# Patient Record
Sex: MALE | Race: WHITE | NOT HISPANIC OR LATINO | URBAN - METROPOLITAN AREA
[De-identification: names, ages, dates, MRNs, and addresses within clinical notes are randomized per-mention and may not be internally consistent; named-entity substitution may affect disease eponyms.]

---

## 2017-11-08 PROBLEM — Z00.00 ENCOUNTER FOR PREVENTIVE HEALTH EXAMINATION: Status: ACTIVE | Noted: 2017-11-08

## 2017-11-14 ENCOUNTER — OUTPATIENT (OUTPATIENT)
Dept: OUTPATIENT SERVICES | Facility: HOSPITAL | Age: 62
LOS: 1 days | End: 2017-11-14
Payer: COMMERCIAL

## 2017-11-14 ENCOUNTER — TRANSCRIPTION ENCOUNTER (OUTPATIENT)
Age: 62
End: 2017-11-14

## 2017-11-14 DIAGNOSIS — R19.4 CHANGE IN BOWEL HABIT: ICD-10-CM

## 2017-11-14 PROCEDURE — 45378 DIAGNOSTIC COLONOSCOPY: CPT

## 2017-12-17 ENCOUNTER — EMERGENCY (EMERGENCY)
Facility: HOSPITAL | Age: 62
LOS: 1 days | Discharge: HOME | End: 2017-12-17
Attending: EMERGENCY MEDICINE | Admitting: EMERGENCY MEDICINE
Payer: COMMERCIAL

## 2017-12-17 VITALS
OXYGEN SATURATION: 98 % | HEART RATE: 84 BPM | SYSTOLIC BLOOD PRESSURE: 139 MMHG | WEIGHT: 214.95 LBS | DIASTOLIC BLOOD PRESSURE: 80 MMHG | RESPIRATION RATE: 15 BRPM | TEMPERATURE: 98 F

## 2017-12-17 VITALS
TEMPERATURE: 99 F | SYSTOLIC BLOOD PRESSURE: 146 MMHG | RESPIRATION RATE: 16 BRPM | OXYGEN SATURATION: 97 % | DIASTOLIC BLOOD PRESSURE: 84 MMHG | HEART RATE: 69 BPM

## 2017-12-17 LAB
BASOPHILS # BLD AUTO: 0 K/UL — SIGNIFICANT CHANGE UP (ref 0–0.2)
BASOPHILS NFR BLD AUTO: 0.9 % — SIGNIFICANT CHANGE UP (ref 0–2)
EOSINOPHIL # BLD AUTO: 0.1 K/UL — SIGNIFICANT CHANGE UP (ref 0–0.5)
EOSINOPHIL NFR BLD AUTO: 1.8 % — SIGNIFICANT CHANGE UP (ref 0–6)
ERYTHROCYTE [SEDIMENTATION RATE] IN BLOOD: 11 MM/HR — HIGH (ref 0–9)
HCT VFR BLD CALC: 40.4 % — SIGNIFICANT CHANGE UP (ref 39–50)
HGB BLD-MCNC: 13.3 G/DL — SIGNIFICANT CHANGE UP (ref 13–17)
LYMPHOCYTES # BLD AUTO: 1.3 K/UL — SIGNIFICANT CHANGE UP (ref 1–3.3)
LYMPHOCYTES # BLD AUTO: 24.9 % — SIGNIFICANT CHANGE UP (ref 13–44)
MCHC RBC-ENTMCNC: 30.8 PG — SIGNIFICANT CHANGE UP (ref 27–34)
MCHC RBC-ENTMCNC: 33 GM/DL — SIGNIFICANT CHANGE UP (ref 32–36)
MCV RBC AUTO: 93.5 FL — SIGNIFICANT CHANGE UP (ref 80–100)
MONOCYTES # BLD AUTO: 0.5 K/UL — SIGNIFICANT CHANGE UP (ref 0–0.9)
MONOCYTES NFR BLD AUTO: 10.1 % — SIGNIFICANT CHANGE UP (ref 2–14)
NEUTROPHILS # BLD AUTO: 3.3 K/UL — SIGNIFICANT CHANGE UP (ref 1.8–7.4)
NEUTROPHILS NFR BLD AUTO: 62.3 % — SIGNIFICANT CHANGE UP (ref 43–77)
PLATELET # BLD AUTO: 234 K/UL — SIGNIFICANT CHANGE UP (ref 150–400)
RBC # BLD: 4.32 M/UL — SIGNIFICANT CHANGE UP (ref 4.2–5.8)
RBC # FLD: 11.9 % — SIGNIFICANT CHANGE UP (ref 10.3–14.5)
WBC # BLD: 5.3 K/UL — SIGNIFICANT CHANGE UP (ref 3.8–10.5)
WBC # FLD AUTO: 5.3 K/UL — SIGNIFICANT CHANGE UP (ref 3.8–10.5)

## 2017-12-17 PROCEDURE — 73562 X-RAY EXAM OF KNEE 3: CPT | Mod: 26,LT

## 2017-12-17 PROCEDURE — 85652 RBC SED RATE AUTOMATED: CPT

## 2017-12-17 PROCEDURE — 85027 COMPLETE CBC AUTOMATED: CPT

## 2017-12-17 PROCEDURE — 99283 EMERGENCY DEPT VISIT LOW MDM: CPT | Mod: 25

## 2017-12-17 PROCEDURE — 73562 X-RAY EXAM OF KNEE 3: CPT

## 2017-12-17 PROCEDURE — 36415 COLL VENOUS BLD VENIPUNCTURE: CPT

## 2017-12-17 PROCEDURE — 86140 C-REACTIVE PROTEIN: CPT

## 2017-12-17 PROCEDURE — 99283 EMERGENCY DEPT VISIT LOW MDM: CPT

## 2017-12-17 RX ORDER — AMLODIPINE AND VALSARTAN 5; 320 MG/1; MG/1
1 TABLET, FILM COATED ORAL
Qty: 0 | Refills: 0 | COMMUNITY

## 2017-12-17 RX ORDER — IBUPROFEN 200 MG
600 TABLET ORAL ONCE
Qty: 0 | Refills: 0 | Status: COMPLETED | OUTPATIENT
Start: 2017-12-17 | End: 2017-12-17

## 2017-12-17 RX ADMIN — Medication 600 MILLIGRAM(S): at 22:37

## 2017-12-17 NOTE — ED PROVIDER NOTE - OBJECTIVE STATEMENT
63 yo male presents with left knee pain x 2 weeks, states he has been walking for exercise, works standing on his feet for many hours, was having pain with bending the knee and leaning on the knee, denies blunt trauma, no fever.  2 days ago was wearing new tight boots and tight sports socks.  noticed bruising developing around the distal shin area, denies calf pain.  took alleve yesterday, none today.  pain is 2/10.  PMD Dr Giang, No ortho

## 2017-12-17 NOTE — ED ADULT NURSE NOTE - CHPI ED SYMPTOMS NEG
no fever/no stiffness/no tingling/no numbness/no weakness/no abrasion/no back pain/no difficulty bearing weight/no deformity

## 2017-12-17 NOTE — ED PROVIDER NOTE - ATTENDING CONTRIBUTION TO CARE
y.o. M with left anterior knee pain x 2 wks 62 y.o. M with left anterior knee pain x 2 wks, presents tonight for swelling and bruising lower leg, pt wore very tight new boots with rolled socks yesterday while working outside all day, had small bruise which appeared to spread today upper leg, the left anterior knee pain has continued to be the same, mostly hurts if touch it, walking ok, no paresthesias, no pain in calf, no travel; On exam pt wd, wn, nad; msk/left LE - no pitting edema, FROM, +ttp left pre-patellar area with swelling, no popliteal area ttp, no calf TTP; neuro - normal sensation, normal strength; skin - no erythema, no increased warmth compared to right, +ecchymosis over upper 1/2 of lower leg, anteriorly, distal to knee to mid shin, clear demarcation distal shin appears to be consistent with boot line; A/P - 1 left prepatellar bursitis, rest, elevate, nsaids, ortho f/u; 2 - ecchymosis left lower leg appears related to tight fitting boots during long shift on feet, advise rest/elevation, return for worsening

## 2017-12-17 NOTE — ED PROVIDER NOTE - LOWER EXTREMITY EXAM, LEFT
left knee pain anterior aspect, from, nvi, + warmth, no erythema, left anterior shin linear ecchymosis, extending to medial aspect of shin/TENDERNESS

## 2017-12-17 NOTE — ED PROVIDER NOTE - CARE PLAN
Principal Discharge DX:	Chronic pain of left knee  Secondary Diagnosis:	Contusion of lower extremity, left, initial encounter Principal Discharge DX:	Prepatellar bursitis, left knee  Secondary Diagnosis:	Contusion of lower extremity, left, initial encounter

## 2017-12-18 LAB — CRP SERPL-MCNC: 0.3 MG/DL — SIGNIFICANT CHANGE UP (ref 0–0.4)

## 2019-12-18 NOTE — ED ADULT NURSE NOTE - PERIPHERAL VASCULAR
FREE:[LAST:[GYN],PHONE:[(   )    -],FAX:[(   )    -],ADDRESS:[information provided to [facility] clinic],FOLLOWUP:[1 week]],FREE:[LAST:[PMD],PHONE:[(   )    -],FAX:[(   )    -],ADDRESS:[[facility]],FOLLOWUP:[1 week]] - - -

## 2020-09-29 ENCOUNTER — RESULT REVIEW (OUTPATIENT)
Age: 65
End: 2020-09-29

## 2022-11-10 PROBLEM — I10 ESSENTIAL (PRIMARY) HYPERTENSION: Chronic | Status: ACTIVE | Noted: 2017-12-17

## 2023-01-05 ENCOUNTER — APPOINTMENT (OUTPATIENT)
Dept: SURGERY | Facility: CLINIC | Age: 68
End: 2023-01-05
Payer: COMMERCIAL

## 2023-01-05 VITALS
TEMPERATURE: 97.7 F | WEIGHT: 212 LBS | OXYGEN SATURATION: 94 % | DIASTOLIC BLOOD PRESSURE: 83 MMHG | BODY MASS INDEX: 28.71 KG/M2 | SYSTOLIC BLOOD PRESSURE: 132 MMHG | HEIGHT: 72 IN | HEART RATE: 73 BPM

## 2023-01-05 DIAGNOSIS — E78.00 PURE HYPERCHOLESTEROLEMIA, UNSPECIFIED: ICD-10-CM

## 2023-01-05 DIAGNOSIS — I10 ESSENTIAL (PRIMARY) HYPERTENSION: ICD-10-CM

## 2023-01-05 DIAGNOSIS — Z12.11 ENCOUNTER FOR SCREENING FOR MALIGNANT NEOPLASM OF COLON: ICD-10-CM

## 2023-01-05 PROCEDURE — 99204 OFFICE O/P NEW MOD 45 MIN: CPT

## 2023-02-13 ENCOUNTER — TRANSCRIPTION ENCOUNTER (OUTPATIENT)
Age: 68
End: 2023-02-13

## 2023-02-14 ENCOUNTER — APPOINTMENT (OUTPATIENT)
Dept: SURGERY | Facility: HOSPITAL | Age: 68
End: 2023-02-14

## 2023-02-14 ENCOUNTER — OUTPATIENT (OUTPATIENT)
Dept: OUTPATIENT SERVICES | Facility: HOSPITAL | Age: 68
LOS: 1 days | End: 2023-02-14
Payer: COMMERCIAL

## 2023-02-14 DIAGNOSIS — Z12.11 ENCOUNTER FOR SCREENING FOR MALIGNANT NEOPLASM OF COLON: ICD-10-CM

## 2023-02-14 LAB — SARS-COV-2 N GENE NPH QL NAA+PROBE: NOT DETECTED

## 2023-02-14 PROCEDURE — 45378 DIAGNOSTIC COLONOSCOPY: CPT

## 2023-02-14 PROCEDURE — G0121: CPT

## 2023-03-06 ENCOUNTER — APPOINTMENT (OUTPATIENT)
Dept: SURGERY | Facility: CLINIC | Age: 68
End: 2023-03-06
Payer: COMMERCIAL

## 2023-03-06 VITALS
SYSTOLIC BLOOD PRESSURE: 153 MMHG | OXYGEN SATURATION: 95 % | HEART RATE: 61 BPM | WEIGHT: 212 LBS | DIASTOLIC BLOOD PRESSURE: 88 MMHG | HEIGHT: 72 IN | TEMPERATURE: 98.1 F | BODY MASS INDEX: 28.71 KG/M2

## 2023-03-06 DIAGNOSIS — K64.8 OTHER HEMORRHOIDS: ICD-10-CM

## 2023-03-06 PROCEDURE — 99213 OFFICE O/P EST LOW 20 MIN: CPT

## 2025-02-27 ENCOUNTER — EMERGENCY (EMERGENCY)
Facility: HOSPITAL | Age: 70
LOS: 1 days | Discharge: HOME | End: 2025-02-27
Attending: EMERGENCY MEDICINE | Admitting: EMERGENCY MEDICINE
Payer: COMMERCIAL

## 2025-02-27 VITALS
DIASTOLIC BLOOD PRESSURE: 83 MMHG | HEART RATE: 80 BPM | OXYGEN SATURATION: 97 % | WEIGHT: 217.82 LBS | SYSTOLIC BLOOD PRESSURE: 145 MMHG | TEMPERATURE: 98 F | HEIGHT: 72 IN | RESPIRATION RATE: 19 BRPM

## 2025-02-27 PROCEDURE — 99284 EMERGENCY DEPT VISIT MOD MDM: CPT

## 2025-02-27 PROCEDURE — 99283 EMERGENCY DEPT VISIT LOW MDM: CPT

## 2025-02-27 RX ORDER — IBUPROFEN 200 MG
1 TABLET ORAL
Qty: 15 | Refills: 0
Start: 2025-02-27 | End: 2025-03-03

## 2025-02-27 RX ORDER — IBUPROFEN 200 MG
400 TABLET ORAL ONCE
Refills: 0 | Status: COMPLETED | OUTPATIENT
Start: 2025-02-27 | End: 2025-02-27

## 2025-02-27 RX ADMIN — Medication 400 MILLIGRAM(S): at 20:27

## 2025-02-27 RX ADMIN — Medication 400 MILLIGRAM(S): at 20:23

## 2025-02-27 NOTE — ED ADULT TRIAGE NOTE - WEIGHT METHOD
Please review; protocol failed.   Pt stated her INS is not in Network, asking for refills until she finds a Dr
actual

## 2025-02-27 NOTE — ED PROVIDER NOTE - PATIENT PORTAL LINK FT
You can access the FollowMyHealth Patient Portal offered by [facility] by registering at the following website: [email or web address]. By joining [facility]’s FollowMyHealth portal, you will also be able to view your health information using other applications (apps) compatible with our system.

## 2025-02-27 NOTE — ED PROVIDER NOTE - PHYSICAL EXAMINATION
ms left ue elbow forearm nontender volar aspect of wrist with swelling ttp. pain with flexion of 4th and 5th digit. nvi from

## 2025-02-27 NOTE — ED PROVIDER NOTE - WHO RECOMMENDED
Jules Rogers MD: I have personally performed a face to face diagnostic evaluation on this patient.  I have reviewed the PA note and agree with the history, exam, and plan of care, except as noted.  History and Exam by me shows same findings as documented

## 2025-02-27 NOTE — ED ADULT NURSE NOTE - NURSING MUSC JOINTS
I have pain to left wrist and hand since Tuesday; its hard to close my hand; denies injury/yes (describe)

## 2025-02-27 NOTE — ED PROVIDER NOTE - OBJECTIVE STATEMENT
Patient is a 69-year-old male with past medical history of hypertension presents with left wrist pain for a few days.  Patient reports left wrist pain and swelling worse with movement.  Patient reports he worked out recently and his dog pulled him when walking him.  Patient took ibuprofen which provided minimal relief.  Patient denies fever numbness known injury

## 2025-02-27 NOTE — ED PROVIDER NOTE - CLINICAL SUMMARY MEDICAL DECISION MAKING FREE TEXT BOX
Patient presents with swelling to his left wrist.  Complains of swelling and redness to the volar aspect of the wrist.  Patient states he was at the gym and also has a strong dog that pulls him when he walks.  Patient has erythema and tenderness over the volar aspect of the wrist on the ulnar side.  Has pain on forced flexion of fourth and fifth fingers.  Symptoms suggestive of tendinitis or tenosynovitis of the flexor tendons of the wrist.  Will splint and start NSAIDs.  Patient will follow-up with his orthopedist.

## 2025-02-27 NOTE — ED PROVIDER NOTE - CARE PROVIDER_API CALL
Gordon Florez.  Orthopaedic Surgery  [address] [place] [id number]  Phone: [phone]  Fax: [phone]  Follow Up Time: 4-6 Days

## 2025-02-27 NOTE — ED PROVIDER NOTE - NSFOLLOWUPINSTRUCTIONS_ED_ALL_ED_FT
1. FOLLOW UP WITH YOUR PRIMARY DOCTOR IN 24-48 HOURS.   2. FOLLOW UP WITH ALL SPECIALIST DISCUSSED DURING YOUR VISIT.   3. TAKE ALL MEDICATIONS PRESCRIBED IN THE ER IF ANY ARE PRESCRIBED. CONTINUE YOUR HOME MEDICATIONS UNLESS OTHERWISE ADVISED DIFFERENTLY.   4. RETURN FOR WORSENING SYMPTOMS OR CONCERNS INCLUDING BUT NOT LIMITED TO FEVER, CHEST PAIN, OR TROUBLE BREATHING OR ANY OTHER CONCERNS  ibuprofen as prescribed take with food  Wrist Pain, Adult  There are many things that can cause wrist pain. Some common causes include:  An injury to the wrist area, such as a sprain, strain, or broken bone (fracture).  Overuse of the joint.  A condition that causes increased pressure on a nerve in the wrist (carpal tunnel syndrome).  Wear and tear of the joints that occurs with aging (osteoarthritis).  Other types of joint inflammation and stiffness (arthritis).  Sometimes, the cause of wrist pain is not known. Often, the pain goes away when you follow instructions from your health care provider for relieving pain at home. This may include resting the wrist, icing the wrist, or using a splint or an elastic wrap for a short time. If your wrist pain continues, it is important to tell your provider.    Follow these instructions at home:  If you have a removable splint or elastic wrap:    Wear the splint or wrap as told by your provider. Remove it only as told by your provider. Ask your provider if you may remove it for bathing.  Check the skin around the splint or wrap every day. Tell your provider about any concerns.  Loosen the splint or wrap if your fingers tingle, become numb, or turn cold and blue.  Keep the splint or wrap clean.  If the splint or wrap is not waterproof:  Do not let it get wet.  Cover it with a watertight covering when you take a bath or shower.  Managing pain, stiffness, and swelling    A bag of ice on a towel on the skin.  If told, put ice on the painful area.  If you have a removable splint or wrap, remove it as told by your provider.  Put ice in a plastic bag.  Place a towel between your skin and the bag or between your splint or wrap and the bag.  Leave the ice on for 20 minutes, 2–3 times a day.  If your skin turns bright red, remove the ice right away to prevent skin damage. The risk of damage is higher if you cannot feel pain, heat, or cold.  Move your fingers often to reduce stiffness and swelling.  Raise (elevate) the injured area above the level of your heart while you are sitting or lying down.  Activity    Rest your affected wrist as told by your provider.  Return to your normal activities as told by your provider. Ask your provider what activities are safe for you.  Ask your provider when it is safe to drive if you have a splint or wrap on your wrist.  Do exercises as told by your provider.  General instructions    Pay attention to any changes in your symptoms.  Take over-the-counter and prescription medicines only as told by your provider.  Contact a health care provider if:  You have a sudden, sharp pain in the wrist, hand, or arm that is different or new.  Any swelling or bruising on your wrist or hand gets worse.  Your skin becomes red, has a rash, or has open sores.  Your pain does not get better or it gets worse.  You have a fever or chills.  Get help right away if:  You lose feeling in your fingers or hand.  Your fingers turn white, very red, or cold and blue.  You cannot move your fingers.  This information is not intended to replace advice given to you by your health care provider. Make sure you discuss any questions you have with your health care provider.    Document Revised: 09/22/2023 Document Reviewed: 09/22/2023  [employer] Patient Education © 2024 [employer]  [employer] logo  Terms and Conditions  Privacy Policy  Editorial Policy  All content on this site: Copyright © 2025 [employer], its licensors, and contributors. All rights are reserved, including those for text and data mining, AI training, and similar technologies. For all open access content, the Creative Commons licensing terms apply.  Cookies are used by this site. To decline or learn more, visit our Cookies page.

## 2025-02-27 NOTE — ED ADULT NURSE NOTE - LOCATION
I have pain to left wrist and hand since Tuesday; its hard to close my hand; denies injury  wrist pain/injury
